# Patient Record
Sex: FEMALE | Race: ASIAN | NOT HISPANIC OR LATINO
[De-identification: names, ages, dates, MRNs, and addresses within clinical notes are randomized per-mention and may not be internally consistent; named-entity substitution may affect disease eponyms.]

---

## 2017-02-27 ENCOUNTER — APPOINTMENT (OUTPATIENT)
Dept: OTOLARYNGOLOGY | Facility: CLINIC | Age: 47
End: 2017-02-27

## 2017-03-13 ENCOUNTER — APPOINTMENT (OUTPATIENT)
Dept: OTOLARYNGOLOGY | Facility: CLINIC | Age: 47
End: 2017-03-13

## 2017-03-13 VITALS
SYSTOLIC BLOOD PRESSURE: 123 MMHG | BODY MASS INDEX: 21.6 KG/M2 | WEIGHT: 110 LBS | HEIGHT: 60 IN | HEART RATE: 59 BPM | TEMPERATURE: 98.4 F | DIASTOLIC BLOOD PRESSURE: 82 MMHG

## 2017-03-30 ENCOUNTER — APPOINTMENT (OUTPATIENT)
Dept: OTOLARYNGOLOGY | Facility: HOSPITAL | Age: 47
End: 2017-03-30

## 2017-04-17 VITALS
TEMPERATURE: 98 F | HEIGHT: 60 IN | OXYGEN SATURATION: 100 % | RESPIRATION RATE: 16 BRPM | WEIGHT: 110.01 LBS | SYSTOLIC BLOOD PRESSURE: 121 MMHG | HEART RATE: 60 BPM | DIASTOLIC BLOOD PRESSURE: 70 MMHG

## 2017-04-18 ENCOUNTER — OUTPATIENT (OUTPATIENT)
Dept: OUTPATIENT SERVICES | Facility: HOSPITAL | Age: 47
LOS: 1 days | Discharge: HOME | End: 2017-04-18
Payer: MEDICAID

## 2017-04-18 ENCOUNTER — APPOINTMENT (OUTPATIENT)
Dept: OTOLARYNGOLOGY | Facility: HOSPITAL | Age: 47
End: 2017-04-18

## 2017-04-18 VITALS
DIASTOLIC BLOOD PRESSURE: 70 MMHG | OXYGEN SATURATION: 98 % | HEART RATE: 81 BPM | RESPIRATION RATE: 20 BRPM | SYSTOLIC BLOOD PRESSURE: 114 MMHG

## 2017-04-18 DIAGNOSIS — Z98.890 OTHER SPECIFIED POSTPROCEDURAL STATES: Chronic | ICD-10-CM

## 2017-04-18 PROCEDURE — C1889: CPT

## 2017-04-18 PROCEDURE — C1781: CPT

## 2017-04-18 PROCEDURE — 17999 UNLISTD PX SKN MUC MEMB SUBQ: CPT

## 2017-04-18 PROCEDURE — C1713: CPT

## 2017-04-18 PROCEDURE — 30465 REPAIR NASAL STENOSIS: CPT

## 2017-04-18 PROCEDURE — 40799 UNLISTED PROCEDURE LIPS: CPT

## 2017-04-18 RX ORDER — MORPHINE SULFATE 50 MG/1
4 CAPSULE, EXTENDED RELEASE ORAL
Qty: 0 | Refills: 0 | Status: DISCONTINUED | OUTPATIENT
Start: 2017-04-18 | End: 2017-04-18

## 2017-04-18 RX ORDER — SODIUM CHLORIDE 9 MG/ML
1000 INJECTION, SOLUTION INTRAVENOUS
Qty: 0 | Refills: 0 | Status: DISCONTINUED | OUTPATIENT
Start: 2017-04-18 | End: 2017-04-18

## 2017-04-18 RX ORDER — ACETAMINOPHEN 500 MG
650 TABLET ORAL EVERY 6 HOURS
Qty: 0 | Refills: 0 | Status: DISCONTINUED | OUTPATIENT
Start: 2017-04-18 | End: 2017-04-18

## 2017-04-18 RX ORDER — BACITRACIN ZINC 500 UNIT/G
1 OINTMENT IN PACKET (EA) TOPICAL
Qty: 1 | Refills: 0
Start: 2017-04-18

## 2017-04-18 RX ORDER — ACETAMINOPHEN WITH CODEINE 300MG-30MG
1 TABLET ORAL
Qty: 20 | Refills: 0
Start: 2017-04-18

## 2017-04-18 RX ORDER — ONDANSETRON 8 MG/1
4 TABLET, FILM COATED ORAL EVERY 6 HOURS
Qty: 0 | Refills: 0 | Status: DISCONTINUED | OUTPATIENT
Start: 2017-04-18 | End: 2017-04-18

## 2017-04-18 RX ADMIN — ONDANSETRON 4 MILLIGRAM(S): 8 TABLET, FILM COATED ORAL at 16:35

## 2017-04-20 DIAGNOSIS — G51.0 BELL'S PALSY: ICD-10-CM

## 2017-04-20 DIAGNOSIS — E78.5 HYPERLIPIDEMIA, UNSPECIFIED: ICD-10-CM

## 2017-04-20 DIAGNOSIS — J34.89 OTHER SPECIFIED DISORDERS OF NOSE AND NASAL SINUSES: ICD-10-CM

## 2017-04-20 DIAGNOSIS — K21.9 GASTRO-ESOPHAGEAL REFLUX DISEASE WITHOUT ESOPHAGITIS: ICD-10-CM

## 2017-04-20 DIAGNOSIS — K13.0 DISEASES OF LIPS: ICD-10-CM

## 2017-04-20 DIAGNOSIS — Z91.041 RADIOGRAPHIC DYE ALLERGY STATUS: ICD-10-CM

## 2017-04-20 PROBLEM — L90.8 OTHER ATROPHIC DISORDERS OF SKIN: Chronic | Status: ACTIVE | Noted: 2017-04-17

## 2017-04-20 PROBLEM — E78.00 PURE HYPERCHOLESTEROLEMIA, UNSPECIFIED: Chronic | Status: ACTIVE | Noted: 2017-04-17

## 2017-04-24 ENCOUNTER — APPOINTMENT (OUTPATIENT)
Dept: OTOLARYNGOLOGY | Facility: CLINIC | Age: 47
End: 2017-04-24

## 2017-04-24 VITALS
HEIGHT: 60 IN | SYSTOLIC BLOOD PRESSURE: 116 MMHG | BODY MASS INDEX: 21.6 KG/M2 | DIASTOLIC BLOOD PRESSURE: 78 MMHG | HEART RATE: 65 BPM | WEIGHT: 110 LBS

## 2017-05-22 ENCOUNTER — APPOINTMENT (OUTPATIENT)
Dept: OTOLARYNGOLOGY | Facility: CLINIC | Age: 47
End: 2017-05-22

## 2017-05-22 VITALS — DIASTOLIC BLOOD PRESSURE: 81 MMHG | SYSTOLIC BLOOD PRESSURE: 114 MMHG | HEART RATE: 60 BPM | OXYGEN SATURATION: 100 %

## 2017-05-26 ENCOUNTER — APPOINTMENT (OUTPATIENT)
Dept: OTOLARYNGOLOGY | Facility: CLINIC | Age: 47
End: 2017-05-26

## 2017-05-26 VITALS — SYSTOLIC BLOOD PRESSURE: 121 MMHG | DIASTOLIC BLOOD PRESSURE: 80 MMHG | HEART RATE: 58 BPM | OXYGEN SATURATION: 97 %

## 2017-06-05 ENCOUNTER — APPOINTMENT (OUTPATIENT)
Dept: OTOLARYNGOLOGY | Facility: CLINIC | Age: 47
End: 2017-06-05

## 2017-06-05 VITALS
HEIGHT: 60 IN | BODY MASS INDEX: 21.6 KG/M2 | DIASTOLIC BLOOD PRESSURE: 66 MMHG | WEIGHT: 110 LBS | TEMPERATURE: 97.3 F | HEART RATE: 57 BPM | SYSTOLIC BLOOD PRESSURE: 111 MMHG

## 2017-06-05 RX ORDER — HYDROCORTISONE 10 MG/G
1 CREAM TOPICAL
Qty: 1 | Refills: 0 | Status: ACTIVE | COMMUNITY
Start: 2017-06-05 | End: 1900-01-01

## 2017-06-12 ENCOUNTER — APPOINTMENT (OUTPATIENT)
Dept: OTOLARYNGOLOGY | Facility: CLINIC | Age: 47
End: 2017-06-12

## 2017-06-12 VITALS
SYSTOLIC BLOOD PRESSURE: 111 MMHG | DIASTOLIC BLOOD PRESSURE: 75 MMHG | BODY MASS INDEX: 21.6 KG/M2 | HEART RATE: 67 BPM | HEIGHT: 60 IN | WEIGHT: 110 LBS

## 2017-06-26 ENCOUNTER — APPOINTMENT (OUTPATIENT)
Dept: OTOLARYNGOLOGY | Facility: CLINIC | Age: 47
End: 2017-06-26

## 2017-06-26 VITALS
TEMPERATURE: 97.8 F | DIASTOLIC BLOOD PRESSURE: 81 MMHG | HEIGHT: 60 IN | SYSTOLIC BLOOD PRESSURE: 122 MMHG | WEIGHT: 110 LBS | BODY MASS INDEX: 21.6 KG/M2 | HEART RATE: 54 BPM

## 2017-06-26 RX ORDER — OMEPRAZOLE 20 MG/1
20 TABLET, DELAYED RELEASE ORAL
Qty: 30 | Refills: 0 | Status: ACTIVE | COMMUNITY
Start: 2016-10-02

## 2017-06-26 RX ORDER — MENTHOL 5.8 MG/1
500 LOZENGE ORAL
Qty: 120 | Refills: 0 | Status: ACTIVE | COMMUNITY
Start: 2017-03-05

## 2017-06-26 RX ORDER — PODOFILOX 5 MG/ML
0.5 SOLUTION TOPICAL
Qty: 3 | Refills: 0 | Status: ACTIVE | COMMUNITY
Start: 2017-03-11

## 2017-06-26 RX ORDER — ERGOCALCIFEROL 1.25 MG/1
1.25 MG CAPSULE, LIQUID FILLED ORAL
Qty: 4 | Refills: 0 | Status: ACTIVE | COMMUNITY
Start: 2017-03-05

## 2017-06-26 RX ORDER — FAMOTIDINE 20 MG/1
20 TABLET, FILM COATED ORAL
Qty: 60 | Refills: 0 | Status: ACTIVE | COMMUNITY
Start: 2017-03-05

## 2017-06-26 RX ORDER — OLOPATADINE HCL 1 MG/ML
0.1 SOLUTION/ DROPS OPHTHALMIC
Qty: 5 | Refills: 0 | Status: ACTIVE | COMMUNITY
Start: 2017-03-22

## 2017-06-26 RX ORDER — AZELASTINE HYDROCHLORIDE 0.5 MG/ML
0.05 SOLUTION/ DROPS OPHTHALMIC
Qty: 6 | Refills: 0 | Status: ACTIVE | COMMUNITY
Start: 2017-06-15

## 2017-06-26 RX ORDER — ATORVASTATIN CALCIUM 10 MG/1
10 TABLET, FILM COATED ORAL
Qty: 30 | Refills: 0 | Status: ACTIVE | COMMUNITY
Start: 2016-10-09

## 2017-07-10 ENCOUNTER — APPOINTMENT (OUTPATIENT)
Dept: OTOLARYNGOLOGY | Facility: CLINIC | Age: 47
End: 2017-07-10

## 2017-07-10 VITALS
SYSTOLIC BLOOD PRESSURE: 122 MMHG | BODY MASS INDEX: 21.6 KG/M2 | TEMPERATURE: 98.7 F | HEIGHT: 60 IN | WEIGHT: 110 LBS | DIASTOLIC BLOOD PRESSURE: 76 MMHG | HEART RATE: 59 BPM

## 2017-07-10 DIAGNOSIS — L08.9 LOCAL INFECTION OF THE SKIN AND SUBCUTANEOUS TISSUE, UNSPECIFIED: ICD-10-CM

## 2017-07-11 PROBLEM — L08.9 FACIAL INFECTION: Status: ACTIVE | Noted: 2017-05-22

## 2017-08-28 ENCOUNTER — APPOINTMENT (OUTPATIENT)
Dept: OTOLARYNGOLOGY | Facility: CLINIC | Age: 47
End: 2017-08-28
Payer: MEDICAID

## 2017-08-28 PROCEDURE — 99213 OFFICE O/P EST LOW 20 MIN: CPT

## 2017-10-02 ENCOUNTER — APPOINTMENT (OUTPATIENT)
Dept: OTOLARYNGOLOGY | Facility: CLINIC | Age: 47
End: 2017-10-02
Payer: MEDICAID

## 2017-10-02 VITALS
BODY MASS INDEX: 21.6 KG/M2 | DIASTOLIC BLOOD PRESSURE: 74 MMHG | WEIGHT: 110 LBS | HEART RATE: 55 BPM | HEIGHT: 60 IN | TEMPERATURE: 98.1 F | SYSTOLIC BLOOD PRESSURE: 120 MMHG

## 2017-10-02 PROCEDURE — 99213 OFFICE O/P EST LOW 20 MIN: CPT

## 2018-04-02 ENCOUNTER — APPOINTMENT (OUTPATIENT)
Dept: OTOLARYNGOLOGY | Facility: CLINIC | Age: 48
End: 2018-04-02
Payer: MEDICAID

## 2018-04-02 VITALS
HEIGHT: 60 IN | HEART RATE: 65 BPM | WEIGHT: 110 LBS | DIASTOLIC BLOOD PRESSURE: 74 MMHG | BODY MASS INDEX: 21.6 KG/M2 | TEMPERATURE: 98.1 F | OXYGEN SATURATION: 100 % | SYSTOLIC BLOOD PRESSURE: 118 MMHG

## 2018-04-02 DIAGNOSIS — G51.0 BELL'S PALSY: ICD-10-CM

## 2018-04-02 DIAGNOSIS — M95.2 OTHER ACQUIRED DEFORMITY OF HEAD: ICD-10-CM

## 2018-04-02 PROCEDURE — 99214 OFFICE O/P EST MOD 30 MIN: CPT

## 2018-04-05 PROBLEM — M95.2 ACQUIRED FACIAL DEFORMITY: Status: ACTIVE | Noted: 2017-08-30

## 2018-04-05 RX ORDER — BACITRACIN 500 [IU]/G
500 OINTMENT TOPICAL
Qty: 28 | Refills: 0 | Status: DISCONTINUED | COMMUNITY
Start: 2017-04-18 | End: 2018-04-05

## 2018-04-05 RX ORDER — AMOXICILLIN AND CLAVULANATE POTASSIUM 875; 125 MG/1; MG/1
875-125 TABLET, COATED ORAL
Qty: 14 | Refills: 0 | Status: DISCONTINUED | COMMUNITY
Start: 2017-04-18 | End: 2018-04-05

## 2018-04-05 RX ORDER — CLINDAMYCIN HYDROCHLORIDE 300 MG/1
300 CAPSULE ORAL EVERY 6 HOURS
Qty: 28 | Refills: 0 | Status: DISCONTINUED | COMMUNITY
Start: 2017-06-05 | End: 2018-04-05

## 2018-04-05 RX ORDER — ACETAMINOPHEN AND CODEINE 300; 30 MG/1; MG/1
300-30 TABLET ORAL
Qty: 20 | Refills: 0 | Status: DISCONTINUED | COMMUNITY
Start: 2017-04-18 | End: 2018-04-05

## 2018-04-05 RX ORDER — CLINDAMYCIN HYDROCHLORIDE 300 MG/1
300 CAPSULE ORAL
Qty: 21 | Refills: 0 | Status: DISCONTINUED | COMMUNITY
Start: 2017-05-22 | End: 2018-04-05

## 2018-04-05 RX ORDER — MUPIROCIN 20 MG/G
2 OINTMENT TOPICAL
Qty: 22 | Refills: 0 | Status: DISCONTINUED | COMMUNITY
Start: 2017-06-17 | End: 2018-04-05

## 2018-04-05 RX ORDER — CLINDAMYCIN HYDROCHLORIDE 300 MG/1
300 CAPSULE ORAL EVERY 6 HOURS
Qty: 56 | Refills: 0 | Status: DISCONTINUED | COMMUNITY
Start: 2017-06-12 | End: 2018-04-05

## 2018-04-05 RX ORDER — CLINDAMYCIN HYDROCHLORIDE 300 MG/1
300 CAPSULE ORAL
Qty: 21 | Refills: 0 | Status: DISCONTINUED | COMMUNITY
Start: 2017-05-26 | End: 2018-04-05

## 2018-04-23 ENCOUNTER — APPOINTMENT (OUTPATIENT)
Dept: OTOLARYNGOLOGY | Facility: CLINIC | Age: 48
End: 2018-04-23

## 2018-04-23 VITALS
WEIGHT: 110 LBS | HEART RATE: 69 BPM | SYSTOLIC BLOOD PRESSURE: 117 MMHG | DIASTOLIC BLOOD PRESSURE: 72 MMHG | TEMPERATURE: 98.3 F | BODY MASS INDEX: 21.6 KG/M2 | HEIGHT: 60 IN | OXYGEN SATURATION: 98 %

## 2018-04-26 ENCOUNTER — APPOINTMENT (OUTPATIENT)
Dept: OTOLARYNGOLOGY | Facility: HOSPITAL | Age: 48
End: 2018-04-26

## 2019-05-13 ENCOUNTER — APPOINTMENT (OUTPATIENT)
Dept: OTOLARYNGOLOGY | Facility: CLINIC | Age: 49
End: 2019-05-13

## 2022-01-18 ENCOUNTER — APPOINTMENT (OUTPATIENT)
Dept: MRI IMAGING | Facility: HOSPITAL | Age: 52
End: 2022-01-18

## 2022-01-25 ENCOUNTER — OUTPATIENT (OUTPATIENT)
Dept: OUTPATIENT SERVICES | Facility: HOSPITAL | Age: 52
LOS: 1 days | End: 2022-01-25
Payer: MEDICAID

## 2022-01-25 ENCOUNTER — RESULT REVIEW (OUTPATIENT)
Age: 52
End: 2022-01-25

## 2022-01-25 ENCOUNTER — APPOINTMENT (OUTPATIENT)
Dept: MRI IMAGING | Facility: HOSPITAL | Age: 52
End: 2022-01-25

## 2022-01-25 DIAGNOSIS — Z98.890 OTHER SPECIFIED POSTPROCEDURAL STATES: Chronic | ICD-10-CM

## 2022-01-25 PROCEDURE — 70553 MRI BRAIN STEM W/O & W/DYE: CPT | Mod: 26

## 2022-01-25 PROCEDURE — A9579: CPT

## 2022-01-25 PROCEDURE — 70545 MR ANGIOGRAPHY HEAD W/DYE: CPT

## 2022-01-25 PROCEDURE — A9585: CPT

## 2022-01-25 PROCEDURE — 70545 MR ANGIOGRAPHY HEAD W/DYE: CPT | Mod: 26,59

## 2022-01-25 PROCEDURE — 70553 MRI BRAIN STEM W/O & W/DYE: CPT

## 2022-02-10 ENCOUNTER — APPOINTMENT (OUTPATIENT)
Dept: NEUROSURGERY | Facility: CLINIC | Age: 52
End: 2022-02-10
Payer: MEDICAID

## 2022-02-10 DIAGNOSIS — Q27.30 ARTERIOVENOUS MALFORMATION, SITE UNSPECIFIED: ICD-10-CM

## 2022-02-10 PROCEDURE — 99213 OFFICE O/P EST LOW 20 MIN: CPT | Mod: 95

## 2022-02-13 PROBLEM — Q27.30 ARTERIOVENOUS MALFORMATION (AVM): Status: ACTIVE | Noted: 2021-12-30

## 2022-02-14 NOTE — ASSESSMENT
[FreeTextEntry1] : Impression:\par Left Frontal AVM - Stable since 2011\par Asymptomatic\par Feels Well\par \par MRA reveals stable posterior left frontal lobe arteriovenous malformation\par MRI reveals no hemorrhage or stroke\par \par Plan:\par Schedule Catheter Cerebral Angiogram in the Next 6-12 Months

## 2022-02-14 NOTE — HISTORY OF PRESENT ILLNESS
[FreeTextEntry1] : Ms. Regalado is a 52yo female who presents today for follow up of a left frontal AVM and MRA review.  The AVM was an incidental finding in 2011 during work up of left eye twitching and swelling.  I performed a cerebral angiogram in 2011 which confirmed the posterior, superior left frontal lobe AVM but treatment was deferred due to the high risk of treatment and asymptomatic nature.  It has been stable in size during serial imaging since diagnosis.\par \par Of note, she has significant facial asymmetry that is related to childhood illness and multiple reconstructive surgeries.\par \par Denies dizziness, visual scintillations, episodes of visual loss or blurring, diplopia, hearing changes, tinnitus, speech problems, swallowing difficulties, numbness, tingling, weakness, tremors, twitches, seizures, imbalance or coordination difficulties

## 2022-03-07 ENCOUNTER — NON-APPOINTMENT (OUTPATIENT)
Age: 52
End: 2022-03-07

## 2022-03-09 DIAGNOSIS — Q28.2 ARTERIOVENOUS MALFORMATION OF CEREBRAL VESSELS: ICD-10-CM

## 2022-04-11 ENCOUNTER — OUTPATIENT (OUTPATIENT)
Dept: OUTPATIENT SERVICES | Facility: HOSPITAL | Age: 52
LOS: 1 days | End: 2022-04-11
Payer: MEDICAID

## 2022-04-11 ENCOUNTER — TRANSCRIPTION ENCOUNTER (OUTPATIENT)
Age: 52
End: 2022-04-11

## 2022-04-11 ENCOUNTER — APPOINTMENT (OUTPATIENT)
Dept: NEUROSURGERY | Facility: HOSPITAL | Age: 52
End: 2022-04-11
Payer: MEDICAID

## 2022-04-11 VITALS
HEIGHT: 60 IN | HEART RATE: 63 BPM | OXYGEN SATURATION: 99 % | DIASTOLIC BLOOD PRESSURE: 74 MMHG | TEMPERATURE: 98 F | RESPIRATION RATE: 18 BRPM | SYSTOLIC BLOOD PRESSURE: 126 MMHG | WEIGHT: 119.93 LBS

## 2022-04-11 VITALS
HEART RATE: 66 BPM | OXYGEN SATURATION: 98 % | RESPIRATION RATE: 19 BRPM | DIASTOLIC BLOOD PRESSURE: 78 MMHG | SYSTOLIC BLOOD PRESSURE: 115 MMHG

## 2022-04-11 DIAGNOSIS — Q27.30 ARTERIOVENOUS MALFORMATION, SITE UNSPECIFIED: ICD-10-CM

## 2022-04-11 DIAGNOSIS — Q28.2 ARTERIOVENOUS MALFORMATION OF CEREBRAL VESSELS: ICD-10-CM

## 2022-04-11 DIAGNOSIS — Z98.890 OTHER SPECIFIED POSTPROCEDURAL STATES: Chronic | ICD-10-CM

## 2022-04-11 PROCEDURE — 36227 PLACE CATH XTRNL CAROTID: CPT

## 2022-04-11 PROCEDURE — 36224 PLACE CATH CAROTD ART: CPT

## 2022-04-11 PROCEDURE — C1887: CPT

## 2022-04-11 PROCEDURE — 36224 PLACE CATH CAROTD ART: CPT | Mod: 50

## 2022-04-11 PROCEDURE — 36226 PLACE CATH VERTEBRAL ART: CPT | Mod: LT

## 2022-04-11 PROCEDURE — C1894: CPT

## 2022-04-11 PROCEDURE — C1769: CPT

## 2022-04-11 PROCEDURE — 36226 PLACE CATH VERTEBRAL ART: CPT

## 2022-04-11 RX ORDER — RANITIDINE HYDROCHLORIDE 150 MG/1
250 TABLET, FILM COATED ORAL
Qty: 0 | Refills: 0 | DISCHARGE

## 2022-04-11 RX ORDER — DIPHENHYDRAMINE HCL 50 MG
1 CAPSULE ORAL
Qty: 0 | Refills: 0 | DISCHARGE

## 2022-04-11 RX ORDER — ATORVASTATIN CALCIUM 80 MG/1
1 TABLET, FILM COATED ORAL
Qty: 0 | Refills: 0 | DISCHARGE

## 2022-04-11 NOTE — ASU PATIENT PROFILE, ADULT - NSICDXPASTMEDICALHX_GEN_ALL_CORE_FT
PAST MEDICAL HISTORY:  Facial paralysis     Facial ptosis     GERD (gastroesophageal reflux disease)     High cholesterol

## 2022-04-11 NOTE — CHART NOTE - NSCHARTNOTEFT_GEN_A_CORE
Interventional Neuro- Radiology   Procedure Note      Procedure: Selective Cerebral Angiography   Pre- Procedure Diagnosis: Left frontal AVM   Post- Procedure Diagnosis: same     : Dr. Jeffy MD  Fellow: Dr. Bruner  Physician Assistant: Mana Delgado PA-C    RN: Sujey Peterson: Maxim     Anesthesia: Dr. Ma (MAC)     I/Os:  Fluids:  De Jesus:  DTV   Contrast:  Estimated Blood Loss: <10cc    Preliminary Report:  Under MAC, using a ___Fr short sheath to the right femoral/ right radial examination of left vertebral artery/ left internal carotid artery/ left external carotid artery/ right vertebral artery/ right internal carotid artery/ right external carotid artery via selective cerebral angiography demonstrates ________. ( Official note to follow).    Patient tolerated procedure well, vital signs stable, hemodynamically stable, no change in neurological status compared to baseline. Results discussed with neurosurgery/ patient and their family. Groin sheath d/c'ed, manual compression held to hemostasis, no active bleeding, no hematoma, Vascade device applied, quick clot and safeguard balloon dressing applied at _____h. Patient transferred to IR recovery for further care/ monitoring. Interventional Neuro- Radiology   Procedure Note      Procedure: Selective Cerebral Angiography   Pre- Procedure Diagnosis: Left frontal AVM   Post- Procedure Diagnosis: same     : Dr. Jeffy MD  Fellow: Dr. dorsey   Physician Assistant: Mana Delgado PA-C    RN: Sujey Peterson: Maxim     Anesthesia: Dr. Ma (MAC)     I/Os:  Fluids:   De Jesus:  DTV   Contrast: 56cc   Estimated Blood Loss: <10cc    Preliminary Report:  Under MAC, using a 5/4Fr short sheath to the right radial examination of left internal carotid artery/  right internal carotid artery via selective cerebral angiography demonstrates left frontal AVM ( Official note to follow).    Patient tolerated procedure well, vital signs stable, hemodynamically stable, no change in neurological status compared to baseline. Results discussed with neurosurgery/ patient and their family. Radial sheath d/c'ed, no active bleeding, no hematoma, TR band dressing applied at 1055h with 16cc of air. Patient transferred to IR recovery for further care/ monitoring. Interventional Neuro- Radiology   Procedure Note      Procedure: Selective Cerebral Angiography   Pre- Procedure Diagnosis: Left frontal AVM   Post- Procedure Diagnosis: same     : Dr. Jeffy MD  Fellow: Dr. dorsey   Physician Assistant: Mana Delgado PA-C    RN: Sujey Peterson: Maxim     Anesthesia: Dr. Ma (MAC)     I/Os:  Fluids: 150cc  De Jesus:  DTV   Contrast: 56cc   Estimated Blood Loss: <10cc    Preliminary Report:  Under MAC, using a 5/4Fr short sheath to the right radial examination of left internal carotid artery/  right internal carotid artery via selective cerebral angiography demonstrates left frontal AVM ( Official note to follow).    Patient tolerated procedure well, vital signs stable, hemodynamically stable, no change in neurological status compared to baseline. Results discussed with neurosurgery/ patient and their family. Radial sheath d/c'ed, no active bleeding, no hematoma, TR band dressing applied at 1055h with 16cc of air. Patient transferred to IR recovery for further care/ monitoring.

## 2022-04-11 NOTE — ASU DISCHARGE PLAN (ADULT/PEDIATRIC) - NS MD DC FALL RISK RISK
For information on Fall & Injury Prevention, visit: [email or web address] OR  [email or web address] OR  https://www.cdc.gov/steadi/patient.html

## 2022-04-11 NOTE — ASU PATIENT PROFILE, ADULT - FALL HARM RISK - UNIVERSAL INTERVENTIONS
Bed in lowest position, wheels locked, appropriate side rails in place/Call bell, personal items and telephone in reach/Instruct patient to call for assistance before getting out of bed or chair/Non-slip footwear when patient is out of bed/[place] to call system/Physically safe environment - no spills, clutter or unnecessary equipment/Purposeful Proactive Rounding/Room/bathroom lighting operational, light cord in reach

## 2022-04-11 NOTE — ASU DISCHARGE PLAN (ADULT/PEDIATRIC) - CARE PROVIDER_API CALL
Candido Heath; MPH)  Radiology  [address], [address]  [place] [address]  Phone: [phone]  Fax: [phone]  Follow Up Time:

## 2022-04-11 NOTE — ASU DISCHARGE PLAN (ADULT/PEDIATRIC) - NURSING INSTRUCTIONS
Please feel free to contact us at [phone] if any problems arise. After 6PM, Monday through Friday, on weekends and on holidays, please call [phone] and ask for the radiology resident on call to be paged.

## 2022-04-11 NOTE — CHART NOTE - NSCHARTNOTEFT_GEN_A_CORE
Interventional Neuro Radiology  Pre-Procedure Note    This is a 52yo female who presents with history of left frontal AVM. The AVM was an incidental finding in 2011 during work up of left eye twitching and swelling. Cerebral angiogram in 2011 which confirmed the posterior, superior left frontal lobe AVM but treatment was deferred due to the high risk of treatment and asymptomatic nature. It has been stable in size during serial imaging since diagnosis. Of note, she has significant facial asymmetry that is related to childhood illness and multiple reconstructive surgeries. Patient presents today to neuro IR for selective cerebral angiography.      Neuro Exam: Awake and alert, oriented x3, fluent [place], left facial paralysis     PAST MEDICAL & SURGICAL HISTORY:  GERD (gastroesophageal reflux disease)  High cholesterol  Facial paralysis  Facial ptosis  History of facial surgery x 6    Social History:   Denies tobacco use    FAMILY HISTORY:  No pertinent family history    Allergies:   IV Contrast (Anaphylaxis)- pre medicated       Current Medications:   Atorvastatin Calcium 10 MG Oral Tablet  Azelastine HCl - 0.05 % Ophthalmic Solution  Calcium Antacid 500 MG Oral Tablet Chewable  Famotidine 20 MG Oral Tablet  Hydrocortisone 1 % External Cream; APPLY SPARINGLY AND RUB IN WELL TO   AFFECTED AREA(S) AS DIRECTED  Olopatadine HCl - 0.1 % Ophthalmic Solution  Omeprazole 20 MG Oral Tablet Delayed Release  Podofilox 0.5 % External Solution  Vitamin D (Ergocalciferol) 1.25 MG (46574 UT) Oral Capsule      Labs:   See carson monson.       HCG: negative     Assessment/Plan:   This is a 52yo female  presents with history of left frontal AVM. Patient presents to neuro-IR for selective cerebral angiography. Procedure/ risks/ benefits/ goals/ alternatives were explained. Risks include but are not limited to stroke/ vessel injury/ hemorrhage/ groin hematoma. All questions answered. Informed content obtained from patient. Consent placed in chart.    Maan Delgado PA-C  [phone]

## 2023-08-09 ENCOUNTER — NON-APPOINTMENT (OUTPATIENT)
Age: 53
End: 2023-08-09

## 2023-08-14 ENCOUNTER — APPOINTMENT (OUTPATIENT)
Dept: RHEUMATOLOGY | Facility: CLINIC | Age: 53
End: 2023-08-14
Payer: MEDICAID

## 2023-08-14 ENCOUNTER — NON-APPOINTMENT (OUTPATIENT)
Age: 53
End: 2023-08-14

## 2023-08-14 VITALS
DIASTOLIC BLOOD PRESSURE: 80 MMHG | SYSTOLIC BLOOD PRESSURE: 118 MMHG | TEMPERATURE: 97.3 F | OXYGEN SATURATION: 98 % | HEIGHT: 60 IN | WEIGHT: 120 LBS | HEART RATE: 54 BPM | BODY MASS INDEX: 23.56 KG/M2

## 2023-08-14 DIAGNOSIS — Z86.39 PERSONAL HISTORY OF OTHER ENDOCRINE, NUTRITIONAL AND METABOLIC DISEASE: ICD-10-CM

## 2023-08-14 DIAGNOSIS — M85.80 OTHER SPECIFIED DISORDERS OF BONE DENSITY AND STRUCTURE, UNSPECIFIED SITE: ICD-10-CM

## 2023-08-14 PROCEDURE — 99204 OFFICE O/P NEW MOD 45 MIN: CPT | Mod: 25

## 2023-08-15 LAB
25(OH)D3 SERPL-MCNC: 43.2 NG/ML
C3 SERPL-MCNC: 117 MG/DL
C4 SERPL-MCNC: 24 MG/DL
TSH SERPL-ACNC: 0.88 UIU/ML

## 2023-08-15 RX ORDER — DIPHENHYDRAMINE HCL 50 MG/1
50 CAPSULE ORAL
Qty: 1 | Refills: 0 | Status: DISCONTINUED | COMMUNITY
Start: 2022-04-09 | End: 2023-08-15

## 2023-08-15 RX ORDER — PREDNISONE 50 MG/1
50 TABLET ORAL
Qty: 3 | Refills: 0 | Status: DISCONTINUED | COMMUNITY
Start: 2022-04-09 | End: 2023-08-15

## 2023-08-15 NOTE — ASSESSMENT
[FreeTextEntry1] : 52-year-old woman referred for rheumatology evaluation.  Patient recently diagnosed with osteopenia, with FRAX score of 4.1%/ 0.3%, started on raloxifene 60 mg qday about three weeks ago, no side effects noted today.  Patient with risk factors for osteoporosis including postmenopausal status, low BMI, no family history of osteoporosis or previous fracture.  Patient at low risk for fracture based on FRAX score, would continue calcium and vitamin D supplementation in addition to weightbearing exercises, patient will discuss raloxifene with primary care doctor given low risk for fracture at this time.  Patient noted to have positive PREET on recent blood test given arthralgias of the hands and feet.  At this time, patient does not meet criteria for an underlying connective tissue disease, however given positive PREET, will obtain further serologies today, previous double-stranded DNA negative as well.  Patient will continue exercises for plantar fasciitis treatment and will follow-up with podiatry as needed.  Further management pending results.

## 2023-08-15 NOTE — PHYSICAL EXAM
[General Appearance - Alert] : alert [General Appearance - In No Acute Distress] : in no acute distress [General Appearance - Well-Appearing] : healthy appearing [Sclera] : the sclera and conjunctiva were normal [Examination Of The Oral Cavity] : the lips and gums were normal [Oropharynx] : the oropharynx was normal [Respiration, Rhythm And Depth] : normal respiratory rhythm and effort [Edema] : there was no peripheral edema [Abnormal Walk] : normal gait [Nail Clubbing] : no clubbing  or cyanosis of the fingernails [Musculoskeletal - Swelling] : no joint swelling seen [Motor Tone] : muscle strength and tone were normal [] : no rash [Oriented To Time, Place, And Person] : oriented to person, place, and time [Impaired Insight] : insight and judgment were intact [Affect] : the affect was normal [Mood] : the mood was normal [FreeTextEntry1] : No active synovitis of the upper or lower extremities

## 2023-08-15 NOTE — HISTORY OF PRESENT ILLNESS
[FreeTextEntry1] : August 14, 2023 Patient referred for rheumatology evaluation. Patient recently diagnosed with osteopenia by bone density Had bone density, as part of annual exam by primary care doctor, due to age of 51 Also reports pain in the hands and feet Was evaluated by podiatrist for foot pain, diagnosed with plantar fasciitis, completing home exercises, no injection or PT to date Patient alsoreports pain in the hands, worse with gripping or holding things No swelling noted, no morning stiffness, no paresthesias Meds: calcium lipitor Started on raloxifene 60 mg qday (three weeks ago) No side effects from medication Menopause since last year No history of fracture No family history of OP Exercises, walking, sometimes run a little bit vitamin C, D, and one a day B12 No steroids in past No nephrolithiasis Had history of hypothyroid, Synthroid stopped after one year about two years ago LMP last year 12/2022, no abnormal bleeding Had recent blood tests, but left at home, will bring next time  No loss of height  DEXA reviewed with patient, first DEXA completed No dental work planned No history of breast cancer No history of abnormal mammograms Labs with vitamin D normal  TSH normal as well  No rashes No photosensitivity No oral ulcers No Raynaud's No muscle weakness No chest pain or shortness of breath No peripheral edema No alopecia.

## 2023-08-15 NOTE — DATA REVIEWED
[FreeTextEntry1] : Bone density 7/7/2023 T score in the left femoral neck -1.6 T score lumbar spine -0.7 T score Total hip -1.0  FRAX score: 4.1%/ 0.3%  Labs 7/7/2023 A1c 5.9% Rheumatoid factor negative Double-stranded DNA negative Uric acid 4.7 PREET positive ESR 8 CRP normal CMP normal

## 2023-10-05 LAB
ANA SER IF-ACNC: NEGATIVE
CCP AB SER IA-ACNC: <8 UNITS
DSDNA AB SER-ACNC: <12 IU/ML
ENA RNP AB SER IA-ACNC: 2.1 AL
ENA SM AB SER IA-ACNC: <0.2 AL
ENA SS-A AB SER IA-ACNC: <0.2 AL
ENA SS-B AB SER IA-ACNC: <0.2 AL
RF+CCP IGG SER-IMP: NEGATIVE
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
## 2024-04-08 ENCOUNTER — OUTPATIENT (OUTPATIENT)
Dept: OUTPATIENT SERVICES | Facility: HOSPITAL | Age: 54
LOS: 1 days | End: 2024-04-08

## 2024-04-08 ENCOUNTER — TRANSCRIPTION ENCOUNTER (OUTPATIENT)
Age: 54
End: 2024-04-08

## 2024-04-08 ENCOUNTER — APPOINTMENT (OUTPATIENT)
Dept: RADIOLOGY | Facility: CLINIC | Age: 54
End: 2024-04-08
Payer: MEDICAID

## 2024-04-08 ENCOUNTER — APPOINTMENT (OUTPATIENT)
Dept: RHEUMATOLOGY | Facility: CLINIC | Age: 54
End: 2024-04-08
Payer: MEDICAID

## 2024-04-08 VITALS
HEIGHT: 60 IN | SYSTOLIC BLOOD PRESSURE: 119 MMHG | OXYGEN SATURATION: 97 % | DIASTOLIC BLOOD PRESSURE: 76 MMHG | TEMPERATURE: 97.3 F | HEART RATE: 77 BPM | WEIGHT: 121 LBS | BODY MASS INDEX: 23.75 KG/M2

## 2024-04-08 DIAGNOSIS — M79.641 PAIN IN RIGHT HAND: ICD-10-CM

## 2024-04-08 DIAGNOSIS — M79.642 PAIN IN RIGHT HAND: ICD-10-CM

## 2024-04-08 DIAGNOSIS — R76.8 OTHER SPECIFIED ABNORMAL IMMUNOLOGICAL FINDINGS IN SERUM: ICD-10-CM

## 2024-04-08 DIAGNOSIS — Z98.890 OTHER SPECIFIED POSTPROCEDURAL STATES: Chronic | ICD-10-CM

## 2024-04-08 PROCEDURE — 73120 X-RAY EXAM OF HAND: CPT | Mod: 26,LT,RT

## 2024-04-08 PROCEDURE — G2211 COMPLEX E/M VISIT ADD ON: CPT | Mod: NC,1L

## 2024-04-08 PROCEDURE — 99214 OFFICE O/P EST MOD 30 MIN: CPT

## 2024-04-08 NOTE — PHYSICAL EXAM
[General Appearance - Alert] : alert [General Appearance - In No Acute Distress] : in no acute distress [General Appearance - Well Nourished] : well nourished [General Appearance - Well Developed] : well developed [General Appearance - Well-Appearing] : healthy appearing [Sclera] : the sclera and conjunctiva were normal [Respiration, Rhythm And Depth] : normal respiratory rhythm and effort [Exaggerated Use Of Accessory Muscles For Inspiration] : no accessory muscle use [Edema] : there was no peripheral edema [Abnormal Walk] : normal gait [Nail Clubbing] : no clubbing  or cyanosis of the fingernails [Musculoskeletal - Swelling] : no joint swelling seen [Motor Tone] : muscle strength and tone were normal [] : no rash [Oriented To Time, Place, And Person] : oriented to person, place, and time [Impaired Insight] : insight and judgment were intact [Affect] : the affect was normal [Mood] : the mood was normal [Auscultation Breath Sounds / Voice Sounds] : lungs were clear to auscultation bilaterally [No Spinal Tenderness] : no spinal tenderness [FreeTextEntry1] : No active synovitis of the upper or lower extremities.  Good handgrip strength bilaterally.   no

## 2024-04-08 NOTE — REVIEW OF SYSTEMS
[Negative] : Heme/Lymph [Arthralgias] : arthralgias [Joint Stiffness] : joint stiffness [Joint Swelling] : no joint swelling [FreeTextEntry9] : hands bilaterally

## 2024-04-08 NOTE — END OF VISIT
[FreeTextEntry3] : All medical record entries made by the Scribe were at my, Dr. Jeannette Birch MD, direction and personally dictated by me on 04/08/2024. I have reviewed the chart and agree that the record accurately reflects my personal performance of the history, physical exam, assessment and plan. I have also personally directed, reviewed, and agreed with the chart. [Time Spent: ___ minutes] : I have spent [unfilled] minutes of time on the encounter.

## 2024-04-08 NOTE — ADDENDUM
[FreeTextEntry1] : I, Dameon Hanson, documented this note as a scribe on behalf of Dr. Jeannette Birch MD on 04/08/2024.

## 2024-04-08 NOTE — ASSESSMENT
[FreeTextEntry1] : 52-year-old woman returns for follow up. Patient with arthralgias of the bilateral hands, no joint swelling or stiffness noted, pain worse at the end of the day.  At last visit, during evaluation of positive PREET, labs noted to have low positive RNP antibody of 2.1. Patient does not meet criteria for MCTD at this time, however given bilateral hand pain, will update labs today in office in addition to xrays of the hands bilaterally. Patient previously diagnosed with osteopenia, with FRAX score of 4.1%/ 0.3%, started on raloxifene 60 mg qday. Patient with risk factors for osteoporosis including postmenopausal status, low BMI, no family history of osteoporosis or previous fracture. Patient at low risk for fracture based on FRAX score, would continue calcium and vitamin D supplementation in addition to weightbearing exercises, patient will discuss raloxifene with primary care doctor given low risk for fracture at this time.

## 2024-04-08 NOTE — HISTORY OF PRESENT ILLNESS
[FreeTextEntry1] : April 8, 2024 Patient returns for follow up of joint pains Patient is feeling generally well Reports pain in bilateral hands, predominantly at night, no swelling noted, not limited in daily functions, no medications required for the pain Pain in left elbow No pain in knees or ankles N peripheral edema No chest pain or shortness of breath No new medications No previous imaging of hands Patient does not report repetitive activities of the hands, does cook Exercises by walking for an hour per day  August 14, 2023 Patient referred for rheumatology evaluation. Patient recently diagnosed with osteopenia by bone density Had bone density, as part of annual exam by primary care doctor, due to age of 51 Also reports pain in the hands and feet Was evaluated by podiatrist for foot pain, diagnosed with plantar fasciitis, completing home exercises, no injection or PT to date Patient alsoreports pain in the hands, worse with gripping or holding things No swelling noted, no morning stiffness, no paresthesias Meds: calcium lipitor Started on raloxifene 60 mg qday (three weeks ago) No side effects from medication Menopause since last year No history of fracture No family history of OP Exercises, walking, sometimes run a little bit vitamin C, D, and one a day B12 No steroids in past No nephrolithiasis Had history of hypothyroid, Synthroid stopped after one year about two years ago LMP last year 12/2022, no abnormal bleeding Had recent blood tests, but left at home, will bring next time  No loss of height  DEXA reviewed with patient, first DEXA completed No dental work planned No history of breast cancer No history of abnormal mammograms Labs with vitamin D normal  TSH normal as well  No rashes No photosensitivity No oral ulcers No Raynaud's No muscle weakness No chest pain or shortness of breath No peripheral edema No alopecia.

## 2024-04-09 LAB
ALBUMIN SERPL ELPH-MCNC: 4.5 G/DL
ALP BLD-CCNC: 65 U/L
ALT SERPL-CCNC: 19 U/L
ANION GAP SERPL CALC-SCNC: 15 MMOL/L
AST SERPL-CCNC: 24 U/L
BASOPHILS # BLD AUTO: 0.05 K/UL
BASOPHILS NFR BLD AUTO: 0.7 %
BILIRUB SERPL-MCNC: 0.3 MG/DL
BUN SERPL-MCNC: 15 MG/DL
CALCIUM SERPL-MCNC: 9.4 MG/DL
CHLORIDE SERPL-SCNC: 104 MMOL/L
CO2 SERPL-SCNC: 24 MMOL/L
CREAT SERPL-MCNC: 0.77 MG/DL
CRP SERPL-MCNC: <3 MG/L
EGFR: 93 ML/MIN/1.73M2
EOSINOPHIL # BLD AUTO: 0.13 K/UL
EOSINOPHIL NFR BLD AUTO: 1.8 %
ERYTHROCYTE [SEDIMENTATION RATE] IN BLOOD BY WESTERGREN METHOD: 19 MM/HR
GLUCOSE SERPL-MCNC: 80 MG/DL
HCT VFR BLD CALC: 41.1 %
HGB BLD-MCNC: 13.1 G/DL
IMM GRANULOCYTES NFR BLD AUTO: 0.1 %
LYMPHOCYTES # BLD AUTO: 2.1 K/UL
LYMPHOCYTES NFR BLD AUTO: 29.8 %
MAN DIFF?: NORMAL
MCHC RBC-ENTMCNC: 31.4 PG
MCHC RBC-ENTMCNC: 31.9 GM/DL
MCV RBC AUTO: 98.6 FL
MONOCYTES # BLD AUTO: 0.53 K/UL
MONOCYTES NFR BLD AUTO: 7.5 %
NEUTROPHILS # BLD AUTO: 4.23 K/UL
NEUTROPHILS NFR BLD AUTO: 60.1 %
PLATELET # BLD AUTO: 235 K/UL
POTASSIUM SERPL-SCNC: 3.9 MMOL/L
PROT SERPL-MCNC: 7.1 G/DL
RBC # BLD: 4.17 M/UL
RBC # FLD: 13.2 %
SODIUM SERPL-SCNC: 142 MMOL/L
WBC # FLD AUTO: 7.05 K/UL

## 2024-04-17 LAB
ENA RNP AB SER IA-ACNC: 2.4 AL
ENA SM AB SER IA-ACNC: <0.2 AL

## 2024-07-09 ENCOUNTER — APPOINTMENT (OUTPATIENT)
Dept: RHEUMATOLOGY | Facility: CLINIC | Age: 54
End: 2024-07-09
Payer: MEDICAID

## 2024-07-09 ENCOUNTER — APPOINTMENT (OUTPATIENT)
Dept: MAMMOGRAPHY | Facility: CLINIC | Age: 54
End: 2024-07-09
Payer: MEDICAID

## 2024-07-09 ENCOUNTER — OUTPATIENT (OUTPATIENT)
Dept: OUTPATIENT SERVICES | Facility: HOSPITAL | Age: 54
LOS: 1 days | End: 2024-07-09

## 2024-07-09 VITALS
HEART RATE: 71 BPM | SYSTOLIC BLOOD PRESSURE: 120 MMHG | HEIGHT: 60 IN | DIASTOLIC BLOOD PRESSURE: 79 MMHG | WEIGHT: 122 LBS | TEMPERATURE: 98 F | OXYGEN SATURATION: 98 % | BODY MASS INDEX: 23.95 KG/M2

## 2024-07-09 DIAGNOSIS — Z98.890 OTHER SPECIFIED POSTPROCEDURAL STATES: Chronic | ICD-10-CM

## 2024-07-09 DIAGNOSIS — R76.8 OTHER SPECIFIED ABNORMAL IMMUNOLOGICAL FINDINGS IN SERUM: ICD-10-CM

## 2024-07-09 DIAGNOSIS — M79.641 PAIN IN RIGHT HAND: ICD-10-CM

## 2024-07-09 DIAGNOSIS — M79.642 PAIN IN RIGHT HAND: ICD-10-CM

## 2024-07-09 PROCEDURE — G2211 COMPLEX E/M VISIT ADD ON: CPT | Mod: NC

## 2024-07-09 PROCEDURE — 77063 BREAST TOMOSYNTHESIS BI: CPT | Mod: 26

## 2024-07-09 PROCEDURE — 99214 OFFICE O/P EST MOD 30 MIN: CPT

## 2024-07-09 PROCEDURE — 77067 SCR MAMMO BI INCL CAD: CPT | Mod: 26

## 2024-07-09 RX ORDER — MELOXICAM 7.5 MG/1
7.5 TABLET ORAL
Qty: 14 | Refills: 0 | Status: ACTIVE | COMMUNITY
Start: 2024-07-09 | End: 1900-01-01

## 2024-07-10 LAB
CRP SERPL-MCNC: <3 MG/L
ERYTHROCYTE [SEDIMENTATION RATE] IN BLOOD BY WESTERGREN METHOD: 2 MM/HR
FOLATE SERPL-MCNC: >20 NG/ML
RHEUMATOID FACT SER QL: <10 IU/ML
VIT B12 SERPL-MCNC: 1212 PG/ML

## 2024-07-11 LAB
CCP AB SER IA-ACNC: <8 UNITS
RF+CCP IGG SER-IMP: NEGATIVE

## 2024-08-21 ENCOUNTER — NON-APPOINTMENT (OUTPATIENT)
Age: 54
End: 2024-08-21

## 2024-08-26 ENCOUNTER — NON-APPOINTMENT (OUTPATIENT)
Age: 54
End: 2024-08-26

## 2024-08-28 ENCOUNTER — RESULT REVIEW (OUTPATIENT)
Age: 54
End: 2024-08-28

## 2024-08-28 ENCOUNTER — OUTPATIENT (OUTPATIENT)
Dept: OUTPATIENT SERVICES | Facility: HOSPITAL | Age: 54
LOS: 1 days | End: 2024-08-28
Payer: MEDICAID

## 2024-08-28 ENCOUNTER — APPOINTMENT (OUTPATIENT)
Dept: RHEUMATOLOGY | Facility: CLINIC | Age: 54
End: 2024-08-28
Payer: MEDICAID

## 2024-08-28 ENCOUNTER — APPOINTMENT (OUTPATIENT)
Dept: ULTRASOUND IMAGING | Facility: CLINIC | Age: 54
End: 2024-08-28

## 2024-08-28 VITALS
BODY MASS INDEX: 23.75 KG/M2 | SYSTOLIC BLOOD PRESSURE: 129 MMHG | WEIGHT: 121 LBS | HEART RATE: 66 BPM | HEIGHT: 60 IN | DIASTOLIC BLOOD PRESSURE: 76 MMHG | OXYGEN SATURATION: 95 % | TEMPERATURE: 97.9 F

## 2024-08-28 DIAGNOSIS — Z98.890 OTHER SPECIFIED POSTPROCEDURAL STATES: Chronic | ICD-10-CM

## 2024-08-28 DIAGNOSIS — M79.642 PAIN IN RIGHT HAND: ICD-10-CM

## 2024-08-28 DIAGNOSIS — M79.641 PAIN IN RIGHT HAND: ICD-10-CM

## 2024-08-28 PROCEDURE — G2211 COMPLEX E/M VISIT ADD ON: CPT | Mod: NC

## 2024-08-28 PROCEDURE — 99213 OFFICE O/P EST LOW 20 MIN: CPT

## 2024-08-28 PROCEDURE — 76881 US COMPL JOINT R-T W/IMG: CPT | Mod: 26,LT

## 2024-08-28 NOTE — ASSESSMENT
[FreeTextEntry1] : 53-year-old woman returns for follow up. Patient with arthralgias of the bilateral hands, no joint swelling or stiffness noted, pain worse at the end of the day.  Previous labs noted to have low positive RNP antibody of 2.1. Patient does not meet criteria for MCTD at this time, however, continues with bilateral hand joint pain. Recent Xray and bloodwork results were normal. Patient is adhering to Meloxicam 7.5mg with benefit. Patient previously diagnosed with osteopenia, with FRAX score of 4.1%/ 0.3%, started on raloxifene 60 mg qday. Patient with risk factors for osteoporosis including postmenopausal status, low BMI, no family history of osteoporosis or previous fracture. Patient at low risk for fracture based on FRAX score, would continue calcium and vitamin D supplementation in addition to weightbearing exercises. Patient will schedule appointment for US of the hands re: continued hand joint pain.

## 2024-08-28 NOTE — ADDENDUM
[FreeTextEntry1] :  I, Deborah Ng, acted solely as a scribe for Dr. Jeannette Birch, direction and personally dictated by me on 08/28/2024. I have reviewed the chart and agree that the record accurately reflects my personal performance of the history, physical exam, assessment, and plan. I have also personally directed, reviewed, and agreed with the chart.

## 2024-08-28 NOTE — DATA REVIEWED
[FreeTextEntry1] : Bone density 7/7/2023 T score in the left femoral neck -1.6 T score lumbar spine -0.7 T score Total hip -1.0  FRAX score: 4.1%/ 0.3%  Labs 7/7/2023 A1c 5.9% Rheumatoid factor negative Double-stranded DNA negative Uric acid 4.7 PREET positive ESR 8 CRP normal CMP normal  Hand xray April 2024: Findings:  No acute fracture or dislocation is visualized.  The joint spaces are preserved. No erosive changes visualized.  Bone mineralization appears unremarkable.  No acute soft tissue abnormality visualized.  Impression:  No abnormality visualized.

## 2024-08-28 NOTE — HISTORY OF PRESENT ILLNESS
[FreeTextEntry1] : August 28,2024 Patient returns for a follow up visit Patient continues with bilateral PIP and MCP joint pain, worsening in the evening  Patient denies numbness or tingling in the hand joints  No joint swelling Patient denies pain in other joints  Recent Xray and blood test reviewed. Patient completed short course of meloxicam 7.5mg daily with benefit, but symptoms recurred when stopped taking Patient walks daily for exercise   July 9, 2024 Patient returns for a follow up of joint symptoms. Patient is feeling well overall. Reports pain in PIP and MCP joints on all digits of both hands, worsens at nighttime, current pain level at a 7/10.  Pain is not associated with swelling or stiffness Does not wake up from sleep due to joint pain. The entire hand is painful, does not describe it as burning pain.  Pain in finger joints worsens with manipulation, wrists are not painful.  No other joint symptoms, no pain in joints of toes.  Occasionally takes Tylenol to relieve pain. Avoids taking it regularly Discussed recent hand x-rays, reviewed xray images.   April 8, 2024 Patient returns for follow up of joint pains Patient is feeling generally well Reports pain in bilateral hands, predominantly at night, no swelling noted, not limited in daily functions, no medications required for the pain Pain in left elbow No pain in knees or ankles N peripheral edema No chest pain or shortness of breath No new medications No previous imaging of hands Patient does not report repetitive activities of the hands, does cook Exercises by walking for an hour per day  August 14, 2023 Patient referred for rheumatology evaluation. Patient recently diagnosed with osteopenia by bone density Had bone density, as part of annual exam by primary care doctor, due to age of 51 Also reports pain in the hands and feet Was evaluated by podiatrist for foot pain, diagnosed with plantar fasciitis, completing home exercises, no injection or PT to date Patient alsoreports pain in the hands, worse with gripping or holding things No swelling noted, no morning stiffness, no paresthesias Meds: calcium lipitor Started on raloxifene 60 mg qday (three weeks ago) No side effects from medication Menopause since last year No history of fracture No family history of OP Exercises, walking, sometimes run a little bit vitamin C, D, and one a day B12 No steroids in past No nephrolithiasis Had history of hypothyroid, Synthroid stopped after one year about two years ago LMP last year 12/2022, no abnormal bleeding Had recent blood tests, but left at home, will bring next time  No loss of height  DEXA reviewed with patient, first DEXA completed No dental work planned No history of breast cancer No history of abnormal mammograms Labs with vitamin D normal  TSH normal as well  No rashes No photosensitivity No oral ulcers No Raynaud's No muscle weakness No chest pain or shortness of breath No peripheral edema No alopecia.

## 2024-08-28 NOTE — PHYSICAL EXAM
[General Appearance - Alert] : alert [General Appearance - In No Acute Distress] : in no acute distress [Abnormal Walk] : normal gait [Nail Clubbing] : no clubbing  or cyanosis of the fingernails [Musculoskeletal - Swelling] : no joint swelling seen [Motor Tone] : muscle strength and tone were normal [Oriented To Time, Place, And Person] : oriented to person, place, and time [Impaired Insight] : insight and judgment were intact [Affect] : the affect was normal [Respiration, Rhythm And Depth] : normal respiratory rhythm and effort [Exaggerated Use Of Accessory Muscles For Inspiration] : no accessory muscle use [FreeTextEntry1] : Good handgrip bilaterally, no active synovitis of the PIP MCP or wrist bilaterally.  Minimal tenderness over the third and fourth PIP joint of the left hand. [] : no rash

## 2024-08-28 NOTE — REVIEW OF SYSTEMS
[Joint Pain] : joint pain [Negative] : Heme/Lymph [Joint Swelling] : no joint swelling [Joint Stiffness] : no joint stiffness [FreeTextEntry9] : Bilateral hand joint pain

## 2024-08-30 ENCOUNTER — TRANSCRIPTION ENCOUNTER (OUTPATIENT)
Age: 54
End: 2024-08-30

## 2024-09-05 ENCOUNTER — NON-APPOINTMENT (OUTPATIENT)
Age: 54
End: 2024-09-05

## 2024-09-09 ENCOUNTER — APPOINTMENT (OUTPATIENT)
Dept: BREAST CENTER | Facility: CLINIC | Age: 54
End: 2024-09-09
Payer: MEDICAID

## 2024-09-09 VITALS
WEIGHT: 123 LBS | HEIGHT: 60 IN | DIASTOLIC BLOOD PRESSURE: 78 MMHG | SYSTOLIC BLOOD PRESSURE: 118 MMHG | HEART RATE: 67 BPM | BODY MASS INDEX: 24.15 KG/M2

## 2024-09-09 DIAGNOSIS — Z86.39 PERSONAL HISTORY OF OTHER ENDOCRINE, NUTRITIONAL AND METABOLIC DISEASE: ICD-10-CM

## 2024-09-09 DIAGNOSIS — R92.8 OTHER ABNORMAL AND INCONCLUSIVE FINDINGS ON DIAGNOSTIC IMAGING OF BREAST: ICD-10-CM

## 2024-09-09 PROCEDURE — 99204 OFFICE O/P NEW MOD 45 MIN: CPT

## 2024-09-09 NOTE — PAST MEDICAL HISTORY
[Menarche Age ____] : age at menarche was [unfilled] [Menopause Age____] : age at menopause was [unfilled] [Total Preg ___] : G[unfilled] [Live Births ___] : P[unfilled]  [History of Hormone Replacement Treatment] : has no history of hormone replacement treatment [FreeTextEntry6] : no [FreeTextEntry7] : no  [FreeTextEntry8] : n/a

## 2024-09-09 NOTE — DATA REVIEWED
[FreeTextEntry1] : 7/9/2024 ([facility]) bilateral screening mammogram: Heterogeneously dense, a clip from prior biopsy is seen throughout the right breast. 2 clips from prior biopsies are seen in the left breast. There is asymmetry in the upper right breast, posterior depth, recommend right diagnostic mammogram and bilateral breast ultrasound. BI-RADS 0  8-24 ([facility]) right DX mammogram & bilateral diagnostic ultrasound: There are asymmetries in the upper right breast, middle third and upper right breast posterior third which effaces on spot compression views and appears similar to prior mammograms dating back to at least 2021, supporting benignity. Probably benign sonographic mass right breast 1:00 axis for which targeted ultrasound in 6 months is recommended. Probably benign BI-RADS 3

## 2024-09-09 NOTE — PLAN
[TextEntry] : 6-month follow-up right breast sonogram due in February 2025 Patient to perform self-breast exams as instructed in the office. Patient to follow-up in February 2025 after imaging completed.

## 2024-09-09 NOTE — HISTORY OF PRESENT ILLNESS
[FreeTextEntry1] : PHONG is a 53 year old female, referred by Dr. Wild Bryan, who presents for initial evaluation regarding abnormal breast imaging. The patient underwent a bilateral screening mammogram on 7/9/2024 showing an asymmetry in the right breast for which right diagnostic mammogram and bilateral screening breast ultrasound were recommended. The patient underwent a right diagnostic mammogram and bilateral breast US on 8-24 showing the areas of asymmetry in the right breast effaces on spot compression views. There was a benign-appearing nodule in the right breast 1:00 8 cm FN measuring 0.7 cm recommended for a 6-month follow-up targeted right breast US.  The patient states that she still has significant right breast pain.  I discussed breast pain with her at length.  We discussed that it is likely benign in nature.  PORTER lifetime risk of 10.7%. She has a history of benign bilateral breast biopsies

## 2024-11-04 ENCOUNTER — NON-APPOINTMENT (OUTPATIENT)
Age: 54
End: 2024-11-04

## 2024-11-14 ENCOUNTER — APPOINTMENT (OUTPATIENT)
Dept: RHEUMATOLOGY | Facility: CLINIC | Age: 54
End: 2024-11-14

## 2025-02-18 ENCOUNTER — APPOINTMENT (OUTPATIENT)
Dept: RHEUMATOLOGY | Facility: CLINIC | Age: 55
End: 2025-02-18

## 2025-02-24 ENCOUNTER — APPOINTMENT (OUTPATIENT)
Dept: BREAST CENTER | Facility: CLINIC | Age: 55
End: 2025-02-24
Payer: MEDICAID

## 2025-02-24 VITALS
HEIGHT: 59 IN | HEART RATE: 79 BPM | SYSTOLIC BLOOD PRESSURE: 114 MMHG | WEIGHT: 126 LBS | DIASTOLIC BLOOD PRESSURE: 71 MMHG | BODY MASS INDEX: 25.4 KG/M2

## 2025-02-24 DIAGNOSIS — Z86.39 PERSONAL HISTORY OF OTHER ENDOCRINE, NUTRITIONAL AND METABOLIC DISEASE: ICD-10-CM

## 2025-02-24 DIAGNOSIS — R92.8 OTHER ABNORMAL AND INCONCLUSIVE FINDINGS ON DIAGNOSTIC IMAGING OF BREAST: ICD-10-CM

## 2025-02-24 PROCEDURE — 99213 OFFICE O/P EST LOW 20 MIN: CPT

## 2025-02-24 RX ORDER — MULTIVIT-MIN/IRON/FOLIC ACID/K 18-600-40
CAPSULE ORAL
Refills: 0 | Status: ACTIVE | COMMUNITY

## 2025-02-24 RX ORDER — MULTIVIT-MIN/FA/LYCOPEN/LUTEIN .4-300-25
TABLET ORAL
Refills: 0 | Status: ACTIVE | COMMUNITY

## 2025-02-24 RX ORDER — PNV NO.95/FERROUS FUM/FOLIC AC 28MG-0.8MG
TABLET ORAL
Refills: 0 | Status: ACTIVE | COMMUNITY